# Patient Record
Sex: MALE | Race: WHITE | NOT HISPANIC OR LATINO | Employment: FULL TIME | URBAN - METROPOLITAN AREA
[De-identification: names, ages, dates, MRNs, and addresses within clinical notes are randomized per-mention and may not be internally consistent; named-entity substitution may affect disease eponyms.]

---

## 2021-06-03 ENCOUNTER — RECORDS - HEALTHEAST (OUTPATIENT)
Dept: ADMINISTRATIVE | Facility: CLINIC | Age: 31
End: 2021-06-03

## 2023-01-01 ENCOUNTER — HOSPITAL ENCOUNTER (EMERGENCY)
Facility: CLINIC | Age: 33
Discharge: HOME | End: 2023-01-01
Attending: STUDENT IN AN ORGANIZED HEALTH CARE EDUCATION/TRAINING PROGRAM | Admitting: STUDENT IN AN ORGANIZED HEALTH CARE EDUCATION/TRAINING PROGRAM
Payer: COMMERCIAL

## 2023-01-01 ENCOUNTER — APPOINTMENT (OUTPATIENT)
Dept: RADIOLOGY | Facility: CLINIC | Age: 33
End: 2023-01-01
Attending: STUDENT IN AN ORGANIZED HEALTH CARE EDUCATION/TRAINING PROGRAM
Payer: COMMERCIAL

## 2023-01-01 VITALS
OXYGEN SATURATION: 90 % | RESPIRATION RATE: 15 BRPM | SYSTOLIC BLOOD PRESSURE: 117 MMHG | HEART RATE: 101 BPM | DIASTOLIC BLOOD PRESSURE: 74 MMHG

## 2023-01-01 DIAGNOSIS — J69.0 ASPIRATION PNEUMONITIS (H): ICD-10-CM

## 2023-01-01 DIAGNOSIS — R09.02 HYPOXIA: ICD-10-CM

## 2023-01-01 DIAGNOSIS — T40.601A OPIATE OVERDOSE, ACCIDENTAL OR UNINTENTIONAL, INITIAL ENCOUNTER (H): ICD-10-CM

## 2023-01-01 LAB
ALBUMIN SERPL-MCNC: 4.3 G/DL (ref 3.5–5)
ALP SERPL-CCNC: 50 U/L (ref 45–120)
ALT SERPL W P-5'-P-CCNC: 376 U/L (ref 0–45)
ANION GAP SERPL CALCULATED.3IONS-SCNC: 15 MMOL/L (ref 5–18)
AST SERPL W P-5'-P-CCNC: 231 U/L (ref 0–40)
ATRIAL RATE - MUSE: 106 BPM
BILIRUB DIRECT SERPL-MCNC: 0.2 MG/DL
BILIRUB SERPL-MCNC: 0.3 MG/DL (ref 0–1)
BUN SERPL-MCNC: 14 MG/DL (ref 8–22)
CALCIUM SERPL-MCNC: 9.3 MG/DL (ref 8.5–10.5)
CHLORIDE BLD-SCNC: 102 MMOL/L (ref 98–107)
CO2 SERPL-SCNC: 23 MMOL/L (ref 22–31)
CREAT SERPL-MCNC: 1.04 MG/DL (ref 0.7–1.3)
DIASTOLIC BLOOD PRESSURE - MUSE: 84 MMHG
ERYTHROCYTE [DISTWIDTH] IN BLOOD BY AUTOMATED COUNT: 11 % (ref 10–15)
ETHANOL SERPL-MCNC: 138 MG/DL
GFR SERPL CREATININE-BSD FRML MDRD: >90 ML/MIN/1.73M2
GLUCOSE BLD-MCNC: 121 MG/DL (ref 70–125)
HCT VFR BLD AUTO: 45.1 % (ref 40–53)
HGB BLD-MCNC: 15.7 G/DL (ref 13.3–17.7)
HOLD SPECIMEN: NORMAL
INTERPRETATION ECG - MUSE: NORMAL
MCH RBC QN AUTO: 30.3 PG (ref 26.5–33)
MCHC RBC AUTO-ENTMCNC: 34.8 G/DL (ref 31.5–36.5)
MCV RBC AUTO: 87 FL (ref 78–100)
P AXIS - MUSE: 70 DEGREES
PLATELET # BLD AUTO: 219 10E3/UL (ref 150–450)
POTASSIUM BLD-SCNC: 3.6 MMOL/L (ref 3.5–5)
PR INTERVAL - MUSE: 180 MS
PROT SERPL-MCNC: 6.9 G/DL (ref 6–8)
QRS DURATION - MUSE: 94 MS
QT - MUSE: 344 MS
QTC - MUSE: 456 MS
R AXIS - MUSE: 85 DEGREES
RBC # BLD AUTO: 5.18 10E6/UL (ref 4.4–5.9)
SODIUM SERPL-SCNC: 140 MMOL/L (ref 136–145)
SYSTOLIC BLOOD PRESSURE - MUSE: 149 MMHG
T AXIS - MUSE: 55 DEGREES
VENTRICULAR RATE- MUSE: 106 BPM
WBC # BLD AUTO: 9.2 10E3/UL (ref 4–11)

## 2023-01-01 PROCEDURE — 82077 ASSAY SPEC XCP UR&BREATH IA: CPT | Performed by: STUDENT IN AN ORGANIZED HEALTH CARE EDUCATION/TRAINING PROGRAM

## 2023-01-01 PROCEDURE — 96374 THER/PROPH/DIAG INJ IV PUSH: CPT

## 2023-01-01 PROCEDURE — 96361 HYDRATE IV INFUSION ADD-ON: CPT

## 2023-01-01 PROCEDURE — 250N000011 HC RX IP 250 OP 636: Performed by: EMERGENCY MEDICINE

## 2023-01-01 PROCEDURE — 85027 COMPLETE CBC AUTOMATED: CPT | Performed by: STUDENT IN AN ORGANIZED HEALTH CARE EDUCATION/TRAINING PROGRAM

## 2023-01-01 PROCEDURE — 93005 ELECTROCARDIOGRAM TRACING: CPT | Performed by: STUDENT IN AN ORGANIZED HEALTH CARE EDUCATION/TRAINING PROGRAM

## 2023-01-01 PROCEDURE — 258N000003 HC RX IP 258 OP 636: Performed by: STUDENT IN AN ORGANIZED HEALTH CARE EDUCATION/TRAINING PROGRAM

## 2023-01-01 PROCEDURE — 36415 COLL VENOUS BLD VENIPUNCTURE: CPT | Performed by: STUDENT IN AN ORGANIZED HEALTH CARE EDUCATION/TRAINING PROGRAM

## 2023-01-01 PROCEDURE — 80053 COMPREHEN METABOLIC PANEL: CPT | Performed by: STUDENT IN AN ORGANIZED HEALTH CARE EDUCATION/TRAINING PROGRAM

## 2023-01-01 PROCEDURE — 82248 BILIRUBIN DIRECT: CPT | Performed by: STUDENT IN AN ORGANIZED HEALTH CARE EDUCATION/TRAINING PROGRAM

## 2023-01-01 PROCEDURE — 71045 X-RAY EXAM CHEST 1 VIEW: CPT

## 2023-01-01 PROCEDURE — 99285 EMERGENCY DEPT VISIT HI MDM: CPT | Mod: 25

## 2023-01-01 RX ORDER — KETOROLAC TROMETHAMINE 15 MG/ML
15 INJECTION, SOLUTION INTRAMUSCULAR; INTRAVENOUS ONCE
Status: COMPLETED | OUTPATIENT
Start: 2023-01-01 | End: 2023-01-01

## 2023-01-01 RX ADMIN — SODIUM CHLORIDE 1000 ML: 9 INJECTION, SOLUTION INTRAVENOUS at 04:44

## 2023-01-01 RX ADMIN — KETOROLAC TROMETHAMINE 15 MG: 15 INJECTION, SOLUTION INTRAMUSCULAR; INTRAVENOUS at 06:29

## 2023-01-01 ASSESSMENT — ACTIVITIES OF DAILY LIVING (ADL)
ADLS_ACUITY_SCORE: 35
ADLS_ACUITY_SCORE: 35

## 2023-01-01 NOTE — ED PROVIDER NOTES
EMERGENCY DEPARTMENT ENCOUNTER       ED Course & Medical Decision Making     2:28 AM I met the patient and performed my initial interview and exam.     Final Impression  32 year old male brought in by EMS for evaluation of suspected opiate overdose.  Per history obtained from EMS as well as patient's significant other it sounds like patient and his significant other had been drinking this evening to celebrate New Year's, she went upstairs to set some de la garza out and get things ready for bed, believes he was left downstairs by himself for about 30 seconds, when he did not come upstairs to  she went to check on him and found him blue and gurgling on the floor.  Patient does have a history of opiate abuse in the past, chart review does show an admission at [facility] on 2/8/2016 for hypoxia secondary to opiate overdose.  Per discussion with patient and his significant other it sounds like he has history of opiate abuse, states he does not use regularly, though sounds like he has been using intermittently potentially over the last indeterminate period of time.  Sounds like his significant other is under the impression that he was no longer using and is understandably distraught for which she saw this evening.  She reports that when she found a balloon gurgling she began chest compressions and called 911.  Emergency services responded within about 5 minutes, gave patient some intranasal Narcan as he was found to have pinpoint pupils and he quickly woke up within about a minute.  By time of arrival in the ED patient is alert, conversant, in no acute distress there is understandably a somewhat foul mood.  Patient denies any complaints other than his chest wall hurting which seems in line with the chest compressions is significant other gave prior to arrival.  Oxygen saturations 89% on room air thus was placed on 2 L nasal cannula.  EKG shows sinus tachycardia, will order 1 L fluid bolus, screening labs drawn and will be  "sent, chest x-ray ordered.    Patient will be observed in the ED, though sounds like he had dramatic clinical improvement with Narcan.  Patient does admit to snorting some type of opiate this evening just before this episode.  Patient will be signed out to Dr. Arrieta at all at shift change, though I suspect if he clinically improves over the next few hours could likely discharge home.    Medical Decision Making    History:    Supplemental history from: Family Member/Significant Other and EMS    External Record(s) reviewed: Documented in HPI, if applicable.    Work Up:    Chart documentation includes differential considered and any EKGs or imaging independently interpreted by provider.    In additional to work up documented, I considered the following work up: See chart documentation, if applicable.    External consultation:    Discussion of management with another provider: See chart documentation, if applicable    Complicating factors:    Care impacted by chronic illness: Mental Health and Other: Substance abuse    Care affected by social determinants of health: Alcohol Abuse and/or Recreational Drug Use    Medications   0.9% sodium chloride BOLUS (has no administration in time range)       New Prescriptions    No medications on file       Final Impression     1. Opiate overdose, accidental or unintentional, initial encounter (H)    2. Hypoxia      Chief Complaint     Chief Complaint   Patient presents with     Drug Overdose     HPI     Scot Aguilar is a 32 year old male who presents for evaluation of overdose.    Per the patient's girlfriend, she and the patient had been drinking alcohol this evening. They were downstairs together and she went upstairs for ~30 seconds at ~1:00 AM. When she returned, the patient was unconscious and not breathing. The patient's girlfriend started chest compressions on the patient and called EMS. She states the patient was blue and \"gurgling\".     Upon EMS arrival, the patient was " "unresponsive with pin point pupils. They administered 1 mg intranasal narcan and put the patient on supplemental O2. He became responsive and vitally stable en route to the ED.    Per the patient, he snorted a line of heroine when his girlfriend was upstairs. He states he \"tries not to\" do heroine but does use on occasion. The patient's girlfriend had been under the impression that the patient had quit drugs 5 years ago and was not aware he was using again. At present, the patient states he does not feel well, endorses some chest pain and shortness of breath. He endorses stress associated with the situation. Patient denies additional medical concerns or complaints at this time.      PMHx: Opiate overdose (2016).    I, Margi Lawson am serving as a scribe to document services personally performed by Dr. Farhan Acosta MD, based on my observation and the provider's statements to me. I, Dr. Farhan Acosta MD attest that Margi Lawson is acting in a scribe capacity, has observed my performance of the services and has documented them in accordance with my direction.    Past Medical History     History reviewed. No pertinent past medical history.  History reviewed. No pertinent surgical history.  History reviewed. No pertinent family history.      No Known Allergies    Relevant past medical, surgical, family and social history as documented above, has been reviewed and discussed with patient. No changes or additions, unless otherwise noted in the HPI.    Current Medications     No current outpatient medications on file.      Review of Systems     Cardiovascular:  Endorses chest wall pain.  GI: Denies nausea or vomiting    Remainder of systems reviewed, unless noted in HPI all others negative.    Physical Exam     Pulse 98   Resp 21   SpO2 95%   Constitutional: Awake, alert, in no acute distress.      Head: Normocephalic, atraumatic.      ENT: Mucous membranes moist.      Eyes: PERRL, EOMI, Conjunctiva normal.    "   Respiratory: Respirations even, unlabored. Lungs clear to ascultation bilaterally, in no acute respiratory distress.  Oxygen saturations 89% on room air.  Cardiovascular: Sinus tachycardia.. +2 radial pulses, equal bilaterally.   GI: Abdomen soft, non-tender to palpation in all 4 quadrants. No guarding or rebound.  Musculoskeletal: Moves all 4 extremities equally, strength symmetrical on bilateral uppers and lowers.      Integument: Warm, dry.   Neurologic: Alert & oriented x 3. Normal speech. Grossly normal motor and sensory function. No focal deficits noted.      Psychiatric: Understandably irritable given the situation    Labs & Imaging     Results for orders placed or performed during the hospital encounter of 01/01/23   CBC (+ platelets, no diff)   Result Value Ref Range    WBC Count 9.2 4.0 - 11.0 10e3/uL    RBC Count 5.18 4.40 - 5.90 10e6/uL    Hemoglobin 15.7 13.3 - 17.7 g/dL    Hematocrit 45.1 40.0 - 53.0 %    MCV 87 78 - 100 fL    MCH 30.3 26.5 - 33.0 pg    MCHC 34.8 31.5 - 36.5 g/dL    RDW 11.0 10.0 - 15.0 %    Platelet Count 219 150 - 450 10e3/uL       EKG     Sinus tachycardia, rate 106.  .  QRS 94.  QTc 456.  No STEMI.       Farhan Acosta MD  01/01/23 [id number]

## 2023-01-01 NOTE — ED TRIAGE NOTES
Pt snorted possibly heroin or fentanyl. Pt spouse called 911 because pt unresponsive and not breathing. CPR was performed according to EMS/Spouse. Pt is in the ER alert and orientated x3. Pt had a dose of narcan intranasal 1mg with police on arrival. Pt is responsive and vitally stable.

## 2023-01-01 NOTE — ED NOTES
Pt feels better. Alert and orientated x3. Spouse and patient had no questions or concerns. Encouraged the patient to get prescription, and follow up with primary care provider.

## 2023-01-01 NOTE — DISCHARGE INSTRUCTIONS
Avoid opiates in the future  Tylenol 650 mg every 4 hours as needed for pain  Ibuprofen 600 mg every 6 hours as needed for pain  Return to the emergency department if you are short of breath or fever greater than 101

## 2023-01-01 NOTE — ED NOTES
Bed: [facility]  Expected date: 1/1/23  Expected time: 2:12 AM  Means of arrival: Ambulance  Comments:  [facility]  31 y/o M  OD  1mg Narcan given. Now alert

## 2023-01-01 NOTE — ED NOTES
"EMERGENCY DEPARTMENT SIGN OUT NOTE        ED COURSE AND MEDICAL DECISION MAKING  Patient was signed out to me by Dr Farhan Acosta at 3:16 AM    In brief, Scot Aguilar is a 32 year old male who initially presented for evaluation of overdose. Upon EMS arrival, the patient was unresponsive with pin point pupils. They administered 1 mg intranasal narcan and put the patient on supplemental O2. He became responsive and vitally stable en route to the ED. Per the patient, he snorted a line of heroine when his girlfriend was upstairs. He states he \"tries not to\" do heroine but does use on occasion. The patient's girlfriend had been under the impression that the patient had quit drugs 5 years ago and was not aware he was using again. At present, the patient states he does not feel well, endorses some chest pain and shortness of breath.    At time of sign out, disposition was pending Chest XR, labs, and re-evaluation.    FINAL IMPRESSION    1. Opiate overdose, accidental or unintentional, initial encounter (H)    2. Hypoxia        ED MEDS  Medications   0.9% sodium chloride BOLUS (has no administration in time range)       LAB  Labs Ordered and Resulted from Time of ED Arrival to Time of ED Departure   ETHYL ALCOHOL LEVEL - Abnormal       Result Value    Alcohol, Blood 138 (*)    HEPATIC FUNCTION PANEL - Abnormal    Bilirubin Total 0.3      Bilirubin Direct 0.2      Protein Total 6.9      Albumin 4.3      Alkaline Phosphatase 50       (*)      (*)    CBC WITH PLATELETS - Normal    WBC Count 9.2      RBC Count 5.18      Hemoglobin 15.7      Hematocrit 45.1      MCV 87      MCH 30.3      MCHC 34.8      RDW 11.0      Platelet Count 219     BASIC METABOLIC PANEL - Normal    Sodium 140      Potassium 3.6      Chloride 102      Carbon Dioxide (CO2) 23      Anion Gap 15      Urea Nitrogen 14      Creatinine 1.04      Calcium 9.3      Glucose 121      GFR Estimate >90           RADIOLOGY    Chest XR,  PA & LAT    " (Results Pending)       DISCHARGE MEDS  New Prescriptions    No medications on file         Christal Last MD  [facility] EMERGENCY ROOM  [address] [place] [id number] [phone]

## 2023-01-08 ENCOUNTER — TELEPHONE (OUTPATIENT)
Dept: NURSING | Facility: CLINIC | Age: 33
End: 2023-01-08

## 2023-01-09 NOTE — TELEPHONE ENCOUNTER
Mom calling (Pt not present and no C2C on file)    Advised cannot triage or give out any information without Pt present.    Mom verbalized understanding.    Domenica Mittal RN, Nurse Advisor 6:52 PM 1/8/2023